# Patient Record
Sex: FEMALE
[De-identification: names, ages, dates, MRNs, and addresses within clinical notes are randomized per-mention and may not be internally consistent; named-entity substitution may affect disease eponyms.]

---

## 2018-06-07 ENCOUNTER — HOSPITAL ENCOUNTER (OUTPATIENT)
Dept: HOSPITAL 95 - ORSCSDS | Age: 38
Discharge: HOME | End: 2018-06-07
Attending: OBSTETRICS & GYNECOLOGY
Payer: COMMERCIAL

## 2018-06-07 VITALS — HEIGHT: 64.02 IN | BODY MASS INDEX: 25.44 KG/M2 | WEIGHT: 149.01 LBS

## 2018-06-07 DIAGNOSIS — O02.1: Primary | ICD-10-CM

## 2018-06-07 DIAGNOSIS — Z67.91: ICD-10-CM

## 2018-06-07 DIAGNOSIS — F41.8: ICD-10-CM

## 2018-06-07 PROCEDURE — 10D17ZZ EXTRACTION OF PRODUCTS OF CONCEPTION, RETAINED, VIA NATURAL OR ARTIFICIAL OPENING: ICD-10-PCS | Performed by: OBSTETRICS & GYNECOLOGY
